# Patient Record
(demographics unavailable — no encounter records)

---

## 2024-10-14 NOTE — HISTORY OF PRESENT ILLNESS
[de-identified] : Date of initial evaluation: 10/14/2024 Patient age: 53 year Body part causing symptoms: Right shoulder  Location of the pain: anterior Pain score today: 10/10 Duration of symptoms: 2.5 weeks History of injury: no known injury Activities that worsen the pain: sleep, lifting, extending Radicular symptoms: none Medications attempted for this problem: Aleve  PT for this problem: none Injections for this problem: none Previous surgery on this body part: none Prior imaging: none  Occupation: teachers aid

## 2024-10-14 NOTE — IMAGING
[de-identified] : (Exam: Shoulder)   Laterality is right   Patient is in no acute distress, alert and oriented Sensation is grossly intact to light touch in the hand Motor function is 5/5 in the hand Capillary refill is less than 2 seconds in the fingers Lymphadenopathy is not present Peripheral edema is not present   Skin is intact Swelling is not present Atrophy is not present Scapular winging is not present Deformity of the AC joint is not present Deformity of the biceps is not present   Bicipital groove tenderness is present AC joint tenderness is not present Scapulothoracic tenderness is not present   Active forward elevation is 90 Passive forward elevation is 170 External rotation at the side is 60 Internal rotation behind the back is to the level of T12   Forward elevation strength is 4/5 External rotation strength at the side is 4/5 Internal rotation strength at the side is 5/5 Deltoid strength of anterior, posterior and lateral heads is 5/5   Bahena test is abnormal OBriens test is abnormal Empty can test is abnormal Speeds test is abnormal Cross body adduction test is normal Belly press test is normal Apprehension and relocation is normal Sulcus sign is normal [Right] : right shoulder [There are no fractures, subluxations or dislocations. No significant abnormalities are seen] : There are no fractures, subluxations or dislocations. No significant abnormalities are seen

## 2024-10-14 NOTE — DISCUSSION/SUMMARY
[de-identified] : (Impression) -right shoulder rotator cuff tendonitis vs early adhesive capsulitis  The diagnosis was explained in detail. The potential non-surgical and surgical treatments were reviewed. The relative risks and benefits of each option were considered relative to the patient age, activity level, medical history, symptom severity and previously attempted treatments.  The patient was advised to consult with their primary medical provider prior to initiation of any new medications to reduce the risk of adverse effects specific to their long-term home medications and medical history. The risk of gastrointestinal irritation and kidney injury specific to long-term NSAID use was discussed.    (Plan)  -Physical therapy recommended for stretching and strengthening. -Cortisone injection performed for symptom relief. -Meloxicam for pain and inflammation, take as needed. -Flexeril for muscle spasms and tightness, take as needed. -MRI is deferred at this time. -Follow up in 6 to 8 weeks. If symptoms persist consider MRI.   (MDM) Problem Complexity -Moderate: chronic illness with exacerbation   Risk -Moderate: injection   Visit Type -New: Patient has not been seen by another provider in my practice within the past 2 years who specializes in orthopedic surgery.  (Procedure: Corticosteroid Injection)   Injection location was the: -right subacromial bursa   Injection contents were: 40 mg of kenalog and 5 mL of 1% lidocaine   Procedure Details: -Informed consent was obtained. Discussed possible risks of corticosteroid injection including hyperglycemia, infection and skin discoloration. -Discussed that due to infection risk, an interval between injection and any future surgery is 6 weeks for arthroscopy and 3 months for arthroplasty. -Injection performed using aseptic technique. Hemostasis was confirmed. -Procedure was tolerated well with no complications.